# Patient Record
(demographics unavailable — no encounter records)

---

## 2024-10-11 NOTE — HISTORY OF PRESENT ILLNESS
[Flip Flops] : flip flops [FreeTextEntry1] : ANNE MARIE is a 75-year-old F present in the [place] office  had surgery of skin graft on 09/09 at [facility] for right foot infection. Patient was discharged from the hospital on 09/11/2024 with oral antibiotics she just finished abx. She saw ID on 10/3/24 states shes awaiting new abx from the ID team   Patient complains of tingling sensation on left foot and discomfort on right. Patient presents with right leg swelling, denies elevating.  FBS- 110 to 120   Wounds continue to improve  They have been applying xeroform DSD to wounds   Denies nausea/vomiting/fever/chills. VNS has been coming every other day

## 2024-10-11 NOTE — ASSESSMENT
[FreeTextEntry1] : RLE extensive ulcerations s/p wound debridement and graft application DOS 9/9/24. Not on any abx finished doxy saw ID awaiting new abx as per patient. Advised to contact them since they are helping with management Doing well vastly improved since prior encounter aseptic debridement performed of all ulcerations with #15 blade to subcutaneous tissues Applied xeroform 4x4 gauze kerlix and ace bandage may continue application q48 hours.   educated on signs and symptoms of infection. Advised Q48 hours dressing changes with xeroform 4x4 gauze kerlix and light ace PTR 1 week for wound care

## 2024-10-11 NOTE — PHYSICAL EXAM
[General Appearance - Alert] : alert [General Appearance - In No Acute Distress] : in no acute distress [0] : left foot posterior tibialis 0 [1+] : left foot dorsalis pedis 1+ [Foot Ulcer] : foot ulcer [Skin Induration] : skin induration [Vibration Dec.] : diminished vibratory sensation at the level of the toes [Delayed in the Left Toes] : capillary refills normal in the left toes [FreeTextEntry1] : Wounds vastly improved since graft application and improvement in perfusion. There is interval improvement in wounds Ulcerations measurement 9.4 x 5.0x  0.1  4.5 x 1.8 x 0.1 and 3.4 x 2.5 x 0.1. Previous tendon exposure has now granulated over. There is erythema from surgery but the redness and swelling has imporved since last encounter. There is no signs of clinical infection.  I aseptically debrided the wounds today with a #15 blade to subcutaneous tissue. No fluctuance/crepitus

## 2024-10-18 NOTE — PHYSICAL EXAM
[General Appearance - Alert] : alert [General Appearance - In No Acute Distress] : in no acute distress [0] : left foot posterior tibialis 0 [1+] : left foot dorsalis pedis 1+ [Foot Ulcer] : foot ulcer [Skin Induration] : skin induration [Vibration Dec.] : diminished vibratory sensation at the level of the toes [Delayed in the Left Toes] : capillary refills normal in the left toes [FreeTextEntry1] : Wounds vastly improved since graft application and improvement in perfusion. There is interval improvement in wounds Ulcerations measurement 8.4 x 4.8 x  0.1  4.5 x 1.8 x 0.1 and 3.4 x 2.5 x 0.1. Previous tendon exposure has now granulated over. There is erythema from surgery but the redness and swelling has improved since last encounter. There is no signs of clinical infection.  I aseptically debrided the wounds today with a #15 blade to subcutaneous tissue. No fluctuance/crepitus

## 2024-10-18 NOTE — ASSESSMENT
[FreeTextEntry1] : RLE extensive ulcerations s/p wound debridement and graft application DOS 9/9/24. Not on any abx - no clinical signs of worsening infection.  Doing well continues to improve We discussed possible Hyperbaric oxygen therapy referral patient and daughter declined she has tried in past and does not want to do it again.  aseptic debridement performed of all ulcerations with #15 blade to subcutaneous tissues Applied xeroform 4x4 gauze kerlix and ace bandage may continue application q48 hours.   educated on signs and symptoms of infection. Advised Q48 hours dressing changes with xeroform 4x4 gauze kerlix and light ace PTR 1 week for wound care

## 2024-10-18 NOTE — HISTORY OF PRESENT ILLNESS
[Flip Flops] : flip flops [FreeTextEntry1] : ANNE MARIE is a 75-year-old F present in the [place] office  had surgery of skin graft on 09/09 at [facility] for right foot infection. Patient was discharged from the hospital on 09/11/2024. She saw ID on 10/3/24. Shes awaiting new abx from the ID team. Patient's daughter states she has been calling ID doctor for the antibiotics and has not hear back from them  I told her she likely does not need anymore antibiotics and she probably doesnt have to follow up with them anyway since there is no signs of infection. Patient has VNS to change dressing twice a week   Patient complains of tingling sensation on left foot and discomfort on right. Patient presents with right leg swelling, denies elevating.  FBS- not taken today  Wounds continue to improve  They have been applying xeroform DSD to wounds   Denies nausea/vomiting/fever/chills. VNS has been coming every other day

## 2024-10-25 NOTE — PHYSICAL EXAM
[General Appearance - Alert] : alert [General Appearance - In No Acute Distress] : in no acute distress [0] : left foot posterior tibialis 0 [1+] : left foot dorsalis pedis 1+ [Foot Ulcer] : foot ulcer [Skin Induration] : skin induration [Vibration Dec.] : diminished vibratory sensation at the level of the toes [Delayed in the Left Toes] : capillary refills normal in the left toes [FreeTextEntry1] : Wounds vastly improved since graft application and improvement in perfusion. There is interval improvement in wounds Ulcerations measurement 7.0 x 4.0 x  0.1  1.5 x 2.0 x 0.1. . Previous tendon exposure has now granulated over. limited to subcutaneous tissue. There is no signs of clinical infection.  There is no erythema and or edema  I aseptically debrided the wounds today with a #15 blade to subcutaneous tissue. No fluctuance/crepitus

## 2024-10-25 NOTE — ASSESSMENT
[FreeTextEntry1] : RLE extensive ulcerations s/p wound debridement and graft application DOS 9/9/24. Not on any abx - no clinical signs of worsening infection.  Doing well continues to improve We discussed possible Hyperbaric oxygen therapy referral patient and daughter declined she has tried in past and does not want to do it again.  aseptic debridement performed of all ulcerations with #15 blade to subcutaneous tissues wounds cleansed prior to debridement Switched to collagen dressings Applied xeroform 4x4 gauze kerlix and ace bandage may continue application q48 hours.   educated on signs and symptoms of infection. Advised Q48 hours dressing changes with collagen xeroform 4x4 gauze kerlix and light ace PTR 2 week for wound care

## 2024-10-25 NOTE — HISTORY OF PRESENT ILLNESS
[Flip Flops] : flip flops [FreeTextEntry1] : ANNE MARIE is a 75-year-old F present in the [place] office  had surgery of skin graft on 09/09 at [facility] for right foot infection. Patient was discharged from the hospital on 09/11/2024.  Patient was seen by ID doctor 10/24/24. No more antibiotics needed as per ID has been off abx for 2 weeks. Doing well ambulating   Patient has VNS to change dressing twice a week   Noticed less drainage and states Swelling has decreased   FBS- not taken today usually in 120s   Wounds continue to improve  They have been applying xeroform DSD to wounds   Denies nausea/vomiting/fever/chills. VNS has been coming every other day

## 2024-10-30 NOTE — PHYSICAL EXAM
[General Appearance - Alert] : alert [General Appearance - In No Acute Distress] : in no acute distress [Hearing Threshold Finger Rub Not [place]] : hearing was normal [Neck Appearance] : the appearance of the neck was normal [] : no respiratory distress [Edema] : there was no peripheral edema [FreeTextEntry1] : healing right ankle ulcers, shalllow, no deep tissue exposure. Not acutely infected  [Oriented To Time, Place, And Person] : oriented to person, place, and time [Affect] : the affect was normal

## 2024-10-30 NOTE — PHYSICAL EXAM
[General Appearance - Alert] : alert [General Appearance - In No Acute Distress] : in no acute distress [Hearing Threshold Finger Rub Not [place]] : hearing was normal [Neck Appearance] : the appearance of the neck was normal [] : no respiratory distress [Edema] : there was no peripheral edema [FreeTextEntry1] : healing right ankle ulcers, shalllow, no deep tissue exposure. Not acutely infected  [Oriented To Time, Place, And Person] : oriented to person, place, and time [Affect] : the affect was normal

## 2024-10-30 NOTE — HISTORY OF PRESENT ILLNESS
[FreeTextEntry1] : 75F with uncontrolled DM (A1C 10.1), PAD s/p RLE bypass in [place], chronic right ankle ulcers, HTN, anxiety was hospitalized at [facility] 8/25 - 9/11 for worsening right ankle wounds with purulent discharge. MR right foot did not show evidence of OM or joint involvement. CTA aorta with run off with PSA of right femoral artery, occluded right femart with reconstitution of flow in popliteal artery and right foot vessels. Underwent diagnostic RLE angio on 9/3, right fem-pop bypass on 9/5 and wound debridement and graft application on 9/9. Treated with cefepime + daptomycin + metronidazole while hospitalized given PNC allergy.  surgical cultures with GNR. Discharged on levofloxacin 750 mg PO daily + doxycycline 100 mg PO BID for additional 10 days.   10/3 HFU - doing really well. Ambulating. Pain much controlled. Right ankle wounds slowly healing. Doing OK with antibiotics except for mild GI upset. Patient and daughter are very pleased with her progress.   10/24/2024 FU - wounds healing very well. Has been off abx for at least 1-2 weeks. Ambulating. Minimal pain. BS better controlled - hovering ~120.

## 2024-10-30 NOTE — HISTORY OF PRESENT ILLNESS
[FreeTextEntry1] : 75F with uncontrolled DM (A1C 10.1), PAD s/p RLE bypass in [place], chronic right ankle ulcers, HTN, anxiety was hospitalized at [facility] 8/25 - 9/11 for worsening right ankle wounds with purulent discharge. MR right foot did not show evidence of OM or joint involvement. CTA aorta with run off with PSA of right femoral artery, occluded right femart with reconstitution of flow in popliteal artery and right foot vessels. Underwent diagnostic RLE angio on 9/3, right fem-pop bypass on 9/5 and wound debridement and graft application on 9/9. Treated with cefepime + daptomycin + metronidazole while hospitalized given PNC allergy.  surgical cultures with GNR. Discharged on levofloxacin 750 mg PO daily + doxycycline 100 mg PO BID for additional 10 days.   10/3 HFU - doing really well. Ambulating. Pain much controlled. Right ankle wounds slowly healing. Doing OK with antibiotics except for mild GI upset. Patient and daughter are very pleased with her progress.   10/24/2024 FU - wounds healing very well. Has been off abx for at least 1-2 weeks. Ambulating. Minimal pain. BS better controlled - hovering ~120.

## 2024-10-30 NOTE — ASSESSMENT
[FreeTextEntry1] : 75F with uncontrolled DM (A1C 10.1), PAD s/p RLE bypass in [place], chronic right ankle ulcers, HTN, anxiety was hospitalized at [facility] 8/25 - 9/11 for worsening right ankle wounds with purulent discharge. MR right foot did not show evidence of OM or joint involvement. CTA aorta with run off with PSA of right femoral artery, occluded right fem art with reconstitution of flow in popliteal artery and right foot vessels. Underwent diagnostic RLE angio on 9/3, right fem-pop bypass on 9/5 and wound debridement and graft application on 9/9. Treated with cefepime + daptomycin + metronidazole while hospitalized given PNC allergy. surgical cultures with GNR. Discharged on levofloxacin 750 mg PO daily + doxycycline 100 mg PO BID.  Wounds are doing remarkably well without evidence of infection. She is ambulating without significant pain. Continue local wound care and follow up with podiatry and vascular surgery. No indication to continue antibiotics at this point.   All questions and concerns addressed with patient and daughter. Follow up in 2-4 weeks.

## 2024-10-30 NOTE — ASSESSMENT
[FreeTextEntry1] : 75F with uncontrolled DM (A1C 10.1), PAD s/p RLE bypass in [place], chronic right ankle ulcers, HTN, anxiety was hospitalized at [facility] 8/25 - 9/11 for worsening right ankle wounds with purulent discharge. MR right foot did not show evidence of OM or joint involvement. CTA aorta with run off with PSA of right femoral artery, occluded right fem art with reconstitution of flow in popliteal artery and right foot vessels. Underwent diagnostic RLE angio on 9/3, right fem-pop bypass on 9/5 and wound debridement and graft application on 9/9. Treated with cefepime + daptomycin + metronidazole while hospitalized given PNC allergy. surgical cultures with GNR. Discharged on levofloxacin 750 mg PO daily + doxycycline 100 mg PO BID.  Wounds are doing remarkably well without evidence of infection. She is ambulating without significant pain. Continue local wound care and follow up with podiatry and vascular surgery. No indication to continue antibiotics at this point.   All questions and concerns addressed with patient and daughter. Follow up in 2-4 weeks.

## 2024-11-01 NOTE — ASSESSMENT
[FreeTextEntry1] : RLE extensive ulcerations s/p wound debridement and graft application DOS 9/9/24. Not on any abx - no clinical signs of worsening infection.  Doing well continues to improve We discussed possible Hyperbaric oxygen therapy/ Wound care center referral patient and daughter declined she has tried in past and does not want to do it again.  aseptic debridement performed of all ulcerations with #15 blade to subcutaneous tissues wounds cleansed prior to debridement Continue collagen dressings via VNS with xeroform Applied xeroform 4x4 gauze kerlix and ace bandage may continue application q48 hours.  We discussed if she doesn't want to wound care center grafts may benefit from split thickness skin graft outpatient. We will rediscuss and possible pursue this next visit  educated on signs and symptoms of infection. Advised Q48 hours dressing changes with collagen dressing xeroform 4x4 gauze kerlix and light ace She has upcoming vascular appointment for follow up of her bypass site  PTR 2 week for wound care

## 2024-11-01 NOTE — PHYSICAL EXAM
[General Appearance - Alert] : alert [General Appearance - In No Acute Distress] : in no acute distress [Delayed in the Left Toes] : capillary refills normal in the left toes [0] : left foot posterior tibialis 0 [1+] : left foot dorsalis pedis 1+ [Foot Ulcer] : foot ulcer [Skin Induration] : skin induration [FreeTextEntry1] : Wounds continue to improve Ulcerations measurement 6.0 x 3.0 x  0.1  lateral foot .          anterior foot 1.5 x 0.5 x 0.1. . Previous tendon exposure has now granulated over. limited to subcutaneous tissue. There is no signs of clinical infection.  There is no erythema and or edema  I aseptically debrided the wounds today with a #15 blade to subcutaneous tissue. No fluctuance/crepitus [Vibration Dec.] : normal vibratory sensation at the level of the toes

## 2024-11-01 NOTE — HISTORY OF PRESENT ILLNESS
[Flip Flops] : flip flops [FreeTextEntry1] : ANNE MARIE is a 75-year-old F present in the [place] office  had surgery of skin graft on 09/09 at [facility] for right foot infection. Patient was discharged from the hospital on 09/11/2024.  off antibiotics Doing well ambulating. wounds continue to improve  Patient has VNS to change dressing twice a week   Noticed less drainage and states Swelling has decreased    A1C ?   Wounds continue to improve  They have been applying xeroform DSD to wounds   Denies nausea/vomiting/fever/chills. VNS has been coming every other day

## 2024-11-08 NOTE — HISTORY OF PRESENT ILLNESS
[FreeTextEntry1] : 75F with uncontrolled DM (A1C 10.1), PAD s/p RLE bypass in [place], chronic right ankle ulcers, HTN, anxiety was hospitalized at [facility] 8/25 - 9/11 for worsening right ankle wounds with purulent discharge. MR right foot did not show evidence of OM or joint involvement. CTA aorta with run off with PSA of right femoral artery, occluded right femart with reconstitution of flow in popliteal artery and right foot vessels. Underwent diagnostic RLE angio on 9/3, right fem-pop bypass on 9/5 and wound debridement and graft application on 9/9. Treated with cefepime + daptomycin + metronidazole while hospitalized given PNC allergy.  surgical cultures with GNR. Discharged on levofloxacin 750 mg PO daily + doxycycline 100 mg PO BID for additional 10 days.   10/3 HFU - doing really well. Ambulating. Pain much controlled. Right ankle wounds slowly healing. Doing OK with antibiotics except for mild GI upset. Patient and daughter are very pleased with her progress.   10/24/2024 FU - wounds healing very well. Has been off abx for at least 1-2 weeks. Ambulating. Minimal pain. BS better controlled - hovering ~120.   11/7/2024 FU - wounds continue to heal. No drainage, increasing pain or redness. Off abx for ~ 1month. Offered another skin graft. Has pain in foot at night. Ambulating with cane.

## 2024-11-08 NOTE — HISTORY OF PRESENT ILLNESS
[Flip Flops] : flip flops [FreeTextEntry1] : ANNE MARIE is a 75-year-old F present in the [place] office  had surgery of skin graft on 09/09 at [facility] for right foot infection. Patient was discharged from the hospital on 09/11/2024.  off antibiotics Doing well ambulating. wounds continue to improve Seen by ID doctor 11/7/24. No more antibiotic needed and was discharged  Patient has VNS to change dressing twice a week   Noticed less drainage and states Swelling has decreased     A1C ?  Has upcoming PCP appointment this week  admits continued nerve pain lateral ankle, states previous gabapentin helped but no longer has any  Denies nausea/vomiting/fever/chills. VNS has been coming every other day

## 2024-11-08 NOTE — PHYSICAL EXAM
[General Appearance - Alert] : alert [General Appearance - In No Acute Distress] : in no acute distress [0] : left foot posterior tibialis 0 [1+] : left foot dorsalis pedis 1+ [Foot Ulcer] : foot ulcer [Skin Induration] : skin induration [Delayed in the Left Toes] : capillary refills normal in the left toes [FreeTextEntry1] : Wounds continue to improve Ulcerations measurement 5.5 x 2.5 x  0.1  lateral foot . continues to improve          anterior foot ulcer healed since prior encounter. Previous tendon exposure has now granulated over. limited to subcutaneous tissue. There is no signs of clinical infection.  There is no erythema and or edema  I aseptically debrided the wounds today with a #15 blade to subcutaneous tissue. No fluctuance/crepitus [Vibration Dec.] : normal vibratory sensation at the level of the toes

## 2024-11-08 NOTE — HISTORY OF PRESENT ILLNESS
[FreeTextEntry1] : 75F with uncontrolled DM (A1C 10.1), PAD s/p RLE bypass in [place], chronic right ankle ulcers, HTN, anxiety was hospitalized at [facility] 8/25 - 9/11 for worsening right ankle wounds with purulent discharge. MR right foot did not show evidence of OM or joint involvement. CTA aorta with run off with PSA of right femoral artery, occluded right femart with reconstitution of flow in popliteal artery and right foot vessels. Underwent diagnostic RLE angio on 9/3, right fem-pop bypass on 9/5 and wound debridement and graft application on 9/9. Treated with cefepime + daptomycin + metronidazole while hospitalized given PNC allergy.  surgical cultures with GNR. Discharged on levofloxacin 750 mg PO daily + doxycycline 100 mg PO BID for additional 10 days.   10/3 HFU - doing really well. Ambulating. Pain much controlled. Right ankle wounds slowly healing. Doing OK with antibiotics except for mild GI upset. Patient and daughter are very pleased with her progress.   10/24/2024 FU - wounds healing very well. Has been off abx for at least 1-2 weeks. Ambulating. Minimal pain. BS better controlled - hovering ~120.   11/7/2024 FU - wounds continue to heal. No drainage, increasing pain or redness. Off abx for ~ 1month. Offered another skin graft. Has pain in foot at night. Ambulating with cane.

## 2024-11-08 NOTE — ASSESSMENT
[FreeTextEntry1] : RLE extensive ulcerations s/p wound debridement and graft application DOS 9/9/24. Not on any abx - no clinical signs of worsening infection.  Doing well continues to improve I prescribed gabapentin 300mg nightly for nerve pain. Discussed risks/benefits/side effects with patient and daughter. discontinue if drowsy  We discussed possible Hyperbaric oxygen therapy/ Wound care center referral patient and daughter declined she has tried in past and does not want to do it again.  aseptic debridement performed of all ulcerations with #15 blade to subcutaneous tissues wounds cleansed prior to debridement Continue collagen dressings via VNS with xeroform Applied xeroform 4x4 gauze kerlix and ace bandage may continue application q48 hours.  We discussed if she doesn't want to wound care center grafts may benefit from split thickness skin graft outpatient. We will rediscuss and possible pursue this next visit. Will wait for december if still present  educated on signs and symptoms of infection. Advised Q48 hours dressing changes with collagen dressing xeroform 4x4 gauze kerlix and light ace PTR 2 week for wound care

## 2024-11-08 NOTE — ASSESSMENT
[FreeTextEntry1] : 75F with uncontrolled DM (A1C 10.1), PAD s/p RLE bypass in [place], chronic right ankle ulcers, HTN, anxiety was hospitalized at [facility] 8/25 - 9/11 for worsening right ankle wounds with purulent discharge. MR right foot did not show evidence of OM or joint involvement. CTA aorta with run off with PSA of right femoral artery, occluded right fem art with reconstitution of flow in popliteal artery and right foot vessels. Underwent diagnostic RLE angio on 9/3, right fem-pop bypass on 9/5 and wound debridement and graft application on 9/9. Treated with cefepime + daptomycin + metronidazole while hospitalized given PNC allergy. surgical cultures with GNR. Discharged on levofloxacin 750 mg PO daily + doxycycline 100 mg PO BID.  Wounds are doing remarkably well without evidence of infection. She is ambulating without significant pain. Continue local wound care and follow up with podiatry and vascular surgery. No indication to continue antibiotics at this point.  All questions and concerns addressed with patient and family in Pashto. Follow up as needed.

## 2024-11-08 NOTE — ASSESSMENT
[FreeTextEntry1] : 75F with uncontrolled DM (A1C 10.1), PAD s/p RLE bypass in [place], chronic right ankle ulcers, HTN, anxiety was hospitalized at [facility] 8/25 - 9/11 for worsening right ankle wounds with purulent discharge. MR right foot did not show evidence of OM or joint involvement. CTA aorta with run off with PSA of right femoral artery, occluded right fem art with reconstitution of flow in popliteal artery and right foot vessels. Underwent diagnostic RLE angio on 9/3, right fem-pop bypass on 9/5 and wound debridement and graft application on 9/9. Treated with cefepime + daptomycin + metronidazole while hospitalized given PNC allergy. surgical cultures with GNR. Discharged on levofloxacin 750 mg PO daily + doxycycline 100 mg PO BID.  Wounds are doing remarkably well without evidence of infection. She is ambulating without significant pain. Continue local wound care and follow up with podiatry and vascular surgery. No indication to continue antibiotics at this point.  All questions and concerns addressed with patient and family in Greek. Follow up as needed.

## 2024-11-08 NOTE — PHYSICAL EXAM
[General Appearance - Alert] : alert [General Appearance - In No Acute Distress] : in no acute distress [Sclera] : the sclera and conjunctiva were normal [Hearing Threshold Finger Rub Not [place]] : hearing was normal [Neck Appearance] : the appearance of the neck was normal [] : no respiratory distress [Edema] : there was no peripheral edema [FreeTextEntry1] : as above  [Oriented To Time, Place, And Person] : oriented to person, place, and time [Affect] : the affect was normal

## 2024-11-08 NOTE — PHYSICAL EXAM
[General Appearance - Alert] : alert [General Appearance - In No Acute Distress] : in no acute distress [Sclera] : the sclera and conjunctiva were normal [Hearing Threshold Finger Rub Not [place]] : hearing was normal [Neck Appearance] : the appearance of the neck was normal [] : no respiratory distress [Edema] : there was no peripheral edema [FreeTextEntry1] : as above  [Oriented To Time, Place, And Person] : oriented to person, place, and time [Affect] : the affect was normal

## 2024-11-22 NOTE — ASSESSMENT
[FreeTextEntry1] : RLE extensive ulcerations s/p wound debridement and graft application DOS 9/9/24. On levoquin for pneumonia, no clinical signs of infection  Doing well continues to improve Continue prescribed gabapentin 300mg nightly for nerve pain. Discussed risks/benefits/side effects with patient and daughter. discontinue if drowsy -  she has no side effects thus far We discussed possible Hyperbaric oxygen therapy/ Wound care center referral patient and daughter declined she has tried in past and does not want to do it again. - she is doing well likely doest need at this time aseptic debridement performed of all ulcerations with #15 blade to subcutaneous tissues wounds cleansed prior to debridement Continue collagen dressings via VNS with xeroform Applied xeroform 4x4 gauze kerlix and ace bandage may continue application q48 hours.  We discussed if she doesn't want to wound care center grafts may benefit from split thickness skin graft outpatient. will defer for now as wound continues to dramatically improve   educated on signs and symptoms of infection. Advised Q48 hours dressing changes with collagen dressing xeroform 4x4 gauze kerlix and light ace PTR 2 week for wound care

## 2024-11-22 NOTE — HISTORY OF PRESENT ILLNESS
[Flip Flops] : flip flops [FreeTextEntry1] : ANNE MARIE is a 75-year-old F present in the [place] office  had surgery of skin graft on 09/09 at [facility] for right foot infection. Patient was discharged from the hospital on 09/11/2024.   wounds continue to improve, Noticed less drainage but color has changed to yellowish.  Patient has VNS to change dressing twice a week and has been applying compression with ace bandages and collage dressings    A1C 7.1 % Nov 2024 admits continued nerve pain lateral ankle, states gabapentin continues to help Evaluated by vascular 11/19/24. She states she has developed  like a balloon in the top back of her calf, had sonogram done and was told she has a fluid which is normal after surgery and nothing to worry about it.   Denies nausea/vomiting/fever/chills.

## 2024-11-22 NOTE — PHYSICAL EXAM
[General Appearance - Alert] : alert [General Appearance - In No Acute Distress] : in no acute distress [0] : left foot posterior tibialis 0 [1+] : left foot dorsalis pedis 1+ [Foot Ulcer] : foot ulcer [Skin Induration] : skin induration [Delayed in the Left Toes] : capillary refills normal in the left toes [FreeTextEntry1] : Wounds continue to improve Ulcerations measurement 4.8 x 2.3 x  0.1  lateral foot . continues to improve          anterior foot ulcer healed since prior encounter. Previous tendon exposure has now granulated over. limited to subcutaneous tissue. There is no signs of clinical infection.  There is no erythema and or edema  I aseptically debrided the wounds today with a #15 blade to subcutaneous tissue. No fluctuance/crepitus [Vibration Dec.] : normal vibratory sensation at the level of the toes

## 2024-11-24 NOTE — ASSESSMENT
[FreeTextEntry1] : patient ~ 3 months s/p RLE fem-pop bypass . doing well post op bypass widely patent + seroma along distal anastomosis . will continue with 3 month f/u

## 2024-11-24 NOTE — PHYSICAL EXAM
[Respiratory Effort] : normal respiratory effort [de-identified] : appears well  [FreeTextEntry1] : RLE triphasic dp/pt signals  [de-identified] : right legs wound essentially healed along lateral foot , + palpable seroma at distal thigh site

## 2024-11-24 NOTE — HISTORY OF PRESENT ILLNESS
[de-identified] : pt s/p right femoral artery to below knee pop bypass at end of august . doing well . here for 3 months follow up , right lateral foot wound almost healed

## 2024-12-06 NOTE — HISTORY OF PRESENT ILLNESS
[Flip Flops] : flip flops [FreeTextEntry1] : ANNE MARIE is a 75-year-old F presents for follow up for RLE wounds  Had graft application 9/9 ulcers have been improving admits some anterior ankle pain associated with swelling likely post bypass edema  Denies nausea/vomiting/fever/chills.   saw vascular 11/24 - [facility] bypass site doing well

## 2024-12-06 NOTE — ASSESSMENT
[FreeTextEntry1] : RLE extensive ulcerations s/p wound debridement and graft application DOS 9/9/24. Doing well continues to improve Continue prescribed gabapentin 300mg nightly for nerve pain. Discussed risks/benefits/side effects with patient and daughter. discontinue if drowsy -  she has no side effects thus far We discussed possible Hyperbaric oxygen therapy/ Wound care center referral patient and daughter declined she has tried in past and does not want to do it again. - she is improving and they want to defer aseptic debridement performed of all ulcerations with #15 blade to subcutaneous tissues wounds cleansed prior to debridement Continue collagen dressings via VNS with xeroform Applied xeroform 4x4 gauze kerlix and ace bandage may continue application q48 hours.  We discussed if she doesn't want to wound care center grafts may benefit from split thickness skin graft outpatient. will defer for now if isnt morei improved after holidays they may consider   educated on signs and symptoms of infection. Advised Q48 hours dressing changes with collagen dressing xeroform 4x4 gauze kerlix and light ace PTR 2 week for wound care

## 2024-12-06 NOTE — PHYSICAL EXAM
[General Appearance - Alert] : alert [General Appearance - In No Acute Distress] : in no acute distress [0] : left foot posterior tibialis 0 [1+] : left foot dorsalis pedis 1+ [Foot Ulcer] : foot ulcer [Skin Induration] : skin induration [Delayed in the Left Toes] : capillary refills normal in the left toes [FreeTextEntry3] : mild interval increase of edema [FreeTextEntry1] : Wounds continue to improve Ulcerations measurement 4.8 x 2.1 x  0.1  lateral foot . continues to improve          anterior foot ulcer healed since prior encounter. Previous tendon exposure has now granulated over. limited to subcutaneous tissue. There is no signs of clinical infection.  There is no erythema and or edema  I aseptically debrided the wounds today with a #15 blade to subcutaneous tissue. No fluctuance/crepitus [Vibration Dec.] : normal vibratory sensation at the level of the toes

## 2024-12-20 NOTE — PHYSICAL EXAM
[General Appearance - Alert] : alert [General Appearance - In No Acute Distress] : in no acute distress [0] : left foot posterior tibialis 0 [1+] : left foot dorsalis pedis 1+ [Foot Ulcer] : foot ulcer [Skin Induration] : skin induration [Oriented To Time, Place, And Person] : oriented to person, place, and time [Impaired Insight] : insight and judgment were intact [Delayed in the Left Toes] : capillary refills normal in the left toes [FreeTextEntry3] : mild interval increase of edema [FreeTextEntry1] : Wounds continue to improve Ulcerations measurement 4.8 x 1.7  x  0.1  lateral foot . continues to improve, has been stagnant recently         anterior foot ulcer healed since prior encounter. Previous tendon exposure has now granulated over. limited to subcutaneous tissue. There is no signs of clinical infection.  There is no erythema and or edema. There is skin atrophy and chronic skin changes from peripheral arterial disease. There is diffuse allodynia foot there is no calf tenderness. There is no fluctuance/crepitus  I aseptically debrided the wounds today with a #15 blade to subcutaneous tissue. No fluctuance/crepitus [Vibration Dec.] : normal vibratory sensation at the level of the toes

## 2024-12-20 NOTE — ASSESSMENT
[FreeTextEntry1] : RLE extensive ulcerations s/p wound debridement and graft application DOS 9/9/24. Doing well continues to improve, recently stagnant Will discuss case with PCP i called office and left message and follow up culture although little concern for infection She has allodynia type pain pertaining to her neuropathy/PAD Continue prescribed gabapentin 300mg nightly for nerve pain. Her pcp increased dosage to additional 100 in morning which i agree with Discussed risks/benefits/side effects with patient and daughter. discontinue if drowsy -  she has no side effects thus far We discussed possible Hyperbaric oxygen therapy/ Wound care center referral patient and daughter declined she has tried in past and does not want to do it again. -  they want to defer aseptic debridement performed of all ulcerations with #15 blade to subcutaneous tissues wounds cleansed prior to debridement Continue collagen dressings via VNS with xeroform Applied xeroform 4x4 gauze kerlix and ace bandage may continue application q48 hours.  Will plan for additional graft application in coming weeks based on availability  educated on signs and symptoms of infection. Advised Q48 hours dressing changes with collagen dressing xeroform 4x4 gauze kerlix and light ace PTR 1-2 week for wound care  I then counseled the patient on performing self-examination of the feet on a daily basis. I explained the importance of this due to the diminished sensation and the greater susceptibility of getting an infection and having additional complications due to the medical condition that exists, especially if they lack protective sensation on their feet. I advised the patient to notify the office right away if increased redness, swelling, pain, open wounds or discharge were observed. I explained the importance of checking the glucose regularly and of keeping the glucose level between 90 -110 and more importantly controlling the HbA1C keeping consistent and trying to achieve as close to normal and HbA1C as possible around 6.0. I told the patient to notify the MD if the glucose level changed to above or below those levels. Advised to check feet daily and do not walk barefoot

## 2024-12-20 NOTE — HISTORY OF PRESENT ILLNESS
[Flip Flops] : flip flops [FreeTextEntry1] : ANNE MARIE is a 75-year-old F presents for follow up for RLE wounds  Had graft application 9/9 ulcers have been improving admits some anterior ankle pain associated with swelling likely post bypass edema  Denies nausea/vomiting/fever/chills.   saw vascular 11/24 - [facility] bypass site doing well   Update 12/20  Patient states she had an appointment on December 13th with her pcp Dr. Lopez for bloodwork results and a follow up. Patient states she complaint to her pcp about having pain on the wound. Patient daughter states Dr. Lopez took a culture of the wound. Patient daughter states she received a call today from her pcp stating she has a bacteria in her wound. Patient daughter states she sent medication to the pharmacy she is not aware what type of medication was sent to the pharmacy. Patient daughter would like Dr. Box to call her pcp to speak about her condition. Patient has an appointment with her pcp on January 31 for a month follow up.  Dr. Lopez [phone] [facility]  A1c- 7.

## 2025-01-02 NOTE — ASSESSMENT
[FreeTextEntry1] : RLE extensive ulcerations s/p wound debridement and graft application DOS 9/9/24. Doing well continues to improve, She has allodynia type pain pertaining to her neuropathy/PAD Continue prescribed gabapentin 300mg nightly for nerve pain. Her pcp increased dosage to additional 100 in morning which i agree with Discussed risks/benefits/side effects with patient and daughter. discontinue if drowsy -  she has no side effects thus far We discussed possible Hyperbaric oxygen therapy/ Wound care center referral patient and daughter declined she has tried in past and does not want to do it again. -  they want to defer aseptic debridement performed of all ulcerations with #15 blade to subcutaneous tissues wounds cleansed prior to debridement Continue collagen dressings via VNS with xeroform Applied xeroform 4x4 gauze kerlix and ace bandage may continue application q48 hours.  Will plan for additional graft application in coming weeks based on availability - she is to get clearance  educated on signs and symptoms of infection. Advised Q48 hours dressing changes with collagen dressing xeroform 4x4 gauze kerlix and light ace PTR 1-2 week for wound care  I then counseled the patient on performing self-examination of the feet on a daily basis. I explained the importance of this due to the diminished sensation and the greater susceptibility of getting an infection and having additional complications due to the medical condition that exists, especially if they lack protective sensation on their feet. I advised the patient to notify the office right away if increased redness, swelling, pain, open wounds or discharge were observed. I explained the importance of checking the glucose regularly and of keeping the glucose level between 90 -110 and more importantly controlling the HbA1C keeping consistent and trying to achieve as close to normal and HbA1C as possible around 6.0. I told the patient to notify the MD if the glucose level changed to above or below those levels. Advised to check feet daily and do not walk barefoot

## 2025-01-02 NOTE — PHYSICAL EXAM
[General Appearance - Alert] : alert [General Appearance - In No Acute Distress] : in no acute distress [0] : left foot posterior tibialis 0 [1+] : left foot dorsalis pedis 1+ [Foot Ulcer] : foot ulcer [Skin Induration] : skin induration [Oriented To Time, Place, And Person] : oriented to person, place, and time [Impaired Insight] : insight and judgment were intact [Delayed in the Left Toes] : capillary refills normal in the left toes [FreeTextEntry3] : mild interval increase of edema [FreeTextEntry1] : Wounds continue to improve Ulcerations measurement 4.5x 1.5  x  0.1  lateral foot/ankle . continues to improve,         anterior foot ulcer healed since prior encounter. Previous tendon exposure has now granulated over. limited to subcutaneous tissue. There is no signs of clinical infection.  There is no erythema and or edema. There is skin atrophy and chronic skin changes from peripheral arterial disease. There is diffuse allodynia foot there is no calf tenderness. There is no fluctuance/crepitus  I aseptically debrided the wounds today with a #15 blade to subcutaneous tissue. No fluctuance/crepitus [Vibration Dec.] : normal vibratory sensation at the level of the toes

## 2025-01-02 NOTE — HISTORY OF PRESENT ILLNESS
[Flip Flops] : flip flops [FreeTextEntry1] : ANNE MARIE is a 75-year-old F presents for follow up for RLE wounds  Had graft application 9/9 ulcers have been improving admits some anterior ankle pain associated with swelling likely post bypass edema  Denies nausea/vomiting/fever/chills.   saw vascular 11/24 - [facility] bypass site doing well   Update 12/20  Patient states she had an appointment on December 13th with her pcp Dr. Lopez for bloodwork results and a follow up. Patient states she complaint to her pcp about having pain on the wound. Patient daughter states Dr. Lopez took a culture of the wound. Patient daughter states she received a call today from her pcp stating she has a bacteria in her wound. Patient daughter states she sent medication to the pharmacy she is not aware what type of medication was sent to the pharmacy. Patient daughter would like Dr. Box to call her pcp to speak about her condition. Patient has an appointment with her pcp on January 31 for a month follow up.  Dr. Lopez [phone] [facility]  A1c- 7.  Update 01/02  Patient is present for dsd dressings. Follow for right lateral ankle wound. Patient is complaining of minimal pain. States pain is getting better. On gabapentin low dose.  Patient daughter states she received a call from our office for the surgery she states they did not give her a date pending graft application right ankle.  Patient daughter states she is not able to get a medical clearance appointment with her pcp until January 10th.  [id number]

## 2025-01-16 NOTE — PHYSICAL EXAM
[General Appearance - Alert] : alert [General Appearance - In No Acute Distress] : in no acute distress [0] : left foot posterior tibialis 0 [1+] : left foot dorsalis pedis 1+ [Foot Ulcer] : foot ulcer [Skin Induration] : skin induration [Oriented To Time, Place, And Person] : oriented to person, place, and time [Impaired Insight] : insight and judgment were intact [Delayed in the Left Toes] : capillary refills normal in the left toes [FreeTextEntry3] : There is mild edema in right leg [FreeTextEntry1] : Interval increase in wound size Ulcerations measurement 6.0x 1.8  x  0.2  lateral foot/ankle  There is no signs of clinical infection.  There is no erythema and or edema. There is skin atrophy and chronic skin changes from peripheral arterial disease. There is diffuse allodynia foot there is no calf tenderness. There is no fluctuance/crepitus  I aseptically debrided the wounds today with a #15 blade to subcutaneous tissue. No fluctuance/crepitus [Vibration Dec.] : normal vibratory sensation at the level of the toes

## 2025-01-16 NOTE — HISTORY OF PRESENT ILLNESS
[Flip Flops] : flip flops [FreeTextEntry1] : ANNE MARIE is a 75-year-old F presents for follow up for RLE wounds  Had graft application 9/9 ulcers have been improving admits some anterior ankle pain associated with swelling likely post bypass edema  Denies nausea/vomiting/fever/chills.   saw vascular 11/24 - [facility] bypass site doing well   Update 12/20  Patient states she had an appointment on December 13th with her pcp Dr. Lopez for bloodwork results and a follow up. Patient states she complaint to her pcp about having pain on the wound. Patient daughter states Dr. Lopez took a culture of the wound. Patient daughter states she received a call today from her pcp stating she has a bacteria in her wound. Patient daughter states she sent medication to the pharmacy she is not aware what type of medication was sent to the pharmacy. Patient daughter would like Dr. Box to call her pcp to speak about her condition. Patient has an appointment with her pcp on January 31 for a month follow up.  Dr. Lopez [phone] [facility]  A1c- 7.  Update 01/02  Patient is present for dsd dressings. Follow for right lateral ankle wound. Patient is complaining of minimal pain. States pain is getting better. On gabapentin low dose.  Patient daughter states she received a call from our office for the surgery she states they did not give her a date pending graft application right ankle.  Patient daughter states she is not able to get a medical clearance appointment with her pcp until January 10th.  [id number]   Update 01/16 Patient is present for follow up on right lateral ankle wound. Patient is complaining of continued nerve pain. Gabapentin has been providing relief. Patient is present for dsd dressings. Patient saw her pcp on January 10th she states everything went well. Patient has a vsn who goes twice a week for dressings change. Patient had dressings change yesterday by the vsn. Denies any increased drainage redness or swelling. States has upcoming vascular appointment [id number]

## 2025-01-16 NOTE — ASSESSMENT
[FreeTextEntry1] : RLE extensive ulcerations s/p wound debridement and graft application DOS 9/9/24. Wounds stagnant, increased size from prior visit She has allodynia type pain pertaining to her neuropathy/PAD Continue prescribed gabapentin 300mg nightly for nerve pain. Her pcp increased dosage to additional 100 in morning which i agree with Discussed risks/benefits/side effects with patient and daughter. discontinue if drowsy -  she has no side effects thus far - represcribed for her We discussed possible Hyperbaric oxygen therapy/ Wound care center referral patient and daughter declined she has tried in past and does not want to do it again. -  they want to defer - I will send her info for topical O2 to help tissue perfusion - She has upcoming vascular appointment aseptic debridement performed of all ulcerations with #15 blade to subcutaneous tissues wounds cleansed prior to debridement Continue collagen dressings via VNS with xeroform Applied xeroform 4x4 gauze kerlix and ace bandage may continue application q48 hours.  Will plan for additional graft application in coming weeks based on availability - she is to get clearance will have coordinator reach out to her   educated on signs and symptoms of infection. Advised Q48 hours dressing changes with collagen dressing xeroform 4x4 gauze kerlix and light ace PTR 2 week for wound care  I then counseled the patient on performing self-examination of the feet on a daily basis. I explained the importance of this due to the diminished sensation and the greater susceptibility of getting an infection and having additional complications due to the medical condition that exists, especially if they lack protective sensation on their feet. I advised the patient to notify the office right away if increased redness, swelling, pain, open wounds or discharge were observed. I explained the importance of checking the glucose regularly and of keeping the glucose level between 90 -110 and more importantly controlling the HbA1C keeping consistent and trying to achieve as close to normal and HbA1C as possible around 6.0. I told the patient to notify the MD if the glucose level changed to above or below those levels. Advised to check feet daily and do not walk barefoot

## 2025-01-30 NOTE — HISTORY OF PRESENT ILLNESS
[Flip Flops] : flip flops [FreeTextEntry1] : ANNE MARIE is a 75-year-old F presents for follow up for RLE wounds  Had graft application 9/9 ulcers have been improving admits some anterior ankle pain associated with swelling likely post bypass edema  Denies nausea/vomiting/fever/chills.   saw vascular 11/24 - [facility] bypass site doing well   A1c- 7.  Update 01/02  Patient is present for dsd dressings. Follow for right lateral ankle wound. Patient is complaining of minimal pain. States pain is getting better. On gabapentin low dose.  Patient daughter states she received a call from our office for the surgery she states they did not give her a date pending graft application right ankle.  Patient daughter states she is not able to get a medical clearance appointment with her pcp until January 10th.  [id number]   Update 01/16 Patient is present for follow up on right lateral ankle wound. Patient is complaining of continued nerve pain. Gabapentin has been providing relief. Patient is present for dsd dressings. Patient saw her pcp on January 10th she states everything went well. Patient has a vsn who goes twice a week for dressings change. Patient had dressings change yesterday by the vsn. Denies any increased drainage redness or swelling. States has upcoming vascular appointment [id number]   Update 1/30 Patient presents with EO2 machine. Nurse changes dresses with collagen and dsd.  Patient has minimal drainage. States was confused about E02 use.  Admits continued neuropathy pain. did not start increasing gabapentin dosage. Denies any nausea/vomiting/fever chills FBS: 200

## 2025-01-30 NOTE — PHYSICAL EXAM
[General Appearance - Alert] : alert [General Appearance - In No Acute Distress] : in no acute distress [0] : left foot posterior tibialis 0 [1+] : left foot dorsalis pedis 1+ [Foot Ulcer] : foot ulcer [Skin Induration] : skin induration [Oriented To Time, Place, And Person] : oriented to person, place, and time [Impaired Insight] : insight and judgment were intact [Delayed in the Left Toes] : capillary refills normal in the left toes [FreeTextEntry3] : There is mild edema in right leg, bypass present with bounding pulses present  [FreeTextEntry1] : Interval improvement in wound compared to last week Ulcerations measurement 5.2 x 1.8  x  0.2  lateral foot/ankle  There is no signs of clinical infection.  There is no erythema and or edema. There is skin atrophy and chronic skin changes from peripheral arterial disease. There is diffuse allodynia foot there is no calf tenderness. There is no fluctuance/crepitus  70% fibrotic  I aseptically debrided the wounds today with a #15 blade to subcutaneous tissue. No fluctuance/crepitus [Vibration Dec.] : normal vibratory sensation at the level of the toes

## 2025-01-30 NOTE — ASSESSMENT
[FreeTextEntry1] : RLE extensive ulcerations s/p wound debridement and graft application DOS 9/9/24. Wound gradual improvement  She has allodynia type pain pertaining to her neuropathy/PAD Continue prescribed gabapentin 300mg nightly for nerve pain. Her pcp increased dosage to additional 100 in morning which i agree with Discussed risks/benefits/side effects with patient and daughter. discontinue if drowsy -  she has no side effects thus far - represcribed for her i educated her on dosage and side effects to look out for it seems to be helping with pain We discussed possible Hyperbaric oxygen therapy/ Wound care center referral patient and daughter declined she has tried in past and does not want to do it again. -  they want to defer She received E02 topical oxygen which we are utilizing. The machine was not charged today. i instructed them on how to utilize and charge the machine. I applied sanyl, hydrofera blue and topical oxygen pad and wrapped the site with kerlix and light coband only to foot not to compress bypass site - She has upcoming vascular appointment in February  aseptic debridement performed of all ulcerations with #15 blade to subcutaneous tissues wounds cleansed prior to debridement Continue VNS 3 times a week can continue dressings as noted today  Will plan for additional graft application in coming weeks based on availability - she is to get clearance (has upcoming appointment) will have coordinator reach out to her   educated on signs and symptoms of infection. Advised Q48 hours dressing changes with collagen dressing xeroform 4x4 gauze kerlix and light ace PTR 2 week for wound care  I then counseled the patient on performing self-examination of the feet on a daily basis. I explained the importance of this due to the diminished sensation and the greater susceptibility of getting an infection and having additional complications due to the medical condition that exists, especially if they lack protective sensation on their feet. I advised the patient to notify the office right away if increased redness, swelling, pain, open wounds or discharge were observed. I explained the importance of checking the glucose regularly and of keeping the glucose level between 90 -110 and more importantly controlling the HbA1C keeping consistent and trying to achieve as close to normal and HbA1C as possible around 6.0. I told the patient to notify the MD if the glucose level changed to above or below those levels. Advised to check feet daily and do not walk barefoot

## 2025-02-06 NOTE — HISTORY OF PRESENT ILLNESS
[Flip Flops] : flip flops [FreeTextEntry1] : ANNE MARIE is a 75-year-old F presents for follow up for RLE wounds  Had graft application 9/9 ulcers have been improving admits some anterior ankle pain associated with swelling likely post bypass edema  Denies nausea/vomiting/fever/chills.   saw vascular 11/24 - sikalas bypass site doing well   A1c- 7.  Presents with new wound on ankle, follow up for wound care she noticed it yesterday after VNS removed dressings. Eo2 device was removed due to swelling.  wound correlates to where she had device on her leg as per daughter may have been two tight. has vascular appointment 21st Pending a skin graft application in hospital on 2/25 Patient is going to pre op appts.  fbs: 160

## 2025-02-06 NOTE — PHYSICAL EXAM
[General Appearance - Alert] : alert [General Appearance - In No Acute Distress] : in no acute distress [0] : left foot posterior tibialis 0 [1+] : left foot dorsalis pedis 1+ [Foot Ulcer] : foot ulcer [Skin Induration] : skin induration [Oriented To Time, Place, And Person] : oriented to person, place, and time [Impaired Insight] : insight and judgment were intact [Delayed in the Left Toes] : capillary refills normal in the left toes [FreeTextEntry3] : There is mild edema in right leg, bypass present with bounding pulses present  [FreeTextEntry1] : Wound stagnant same size from previous week.  Ulcerations measurement 5.2 x 1.8  x  0.2  lateral foot/ankle  There is no signs of clinical infection.  There is no erythema and or edema. There is skin atrophy and chronic skin changes from peripheral arterial disease. There is diffuse allodynia foot there is no calf tenderness. There is no fluctuance/crepitus  Wound  70% fibrotic  I aseptically debrided the wounds today with a #15 blade to subcutaneous tissue. No fluctuance/crepitus  There is new anterior ankle small 3 ulcerations they are each 0.4 x 0.4 x 0.1 with mild serous drainage does not probe. no erythema  there is mild edema which has been consistent with previous weeks  [Vibration Dec.] : normal vibratory sensation at the level of the toes

## 2025-02-06 NOTE — ASSESSMENT
[FreeTextEntry1] : RLE extensive ulcerations s/p wound debridement and graft application DOS 9/9/24. New onset right anterior ankle ulceration since last week  She has allodynia type pain pertaining to her neuropathy/PAD Continue prescribed gabapentin 300mg nightly for nerve pain.  We discussed possible Hyperbaric oxygen therapy/ Wound care center referral patient and daughter declined she has tried in past and does not want to do it again. -  they want to defer Discontinue E02 device as its improper use may have attributed to an anterior ankle ulcerations but applying too much compression, switch back to collagen dressings with silver alginate  - She has upcoming vascular appointment  aseptic debridement performed of all ulcerations with #15 blade to subcutaneous tissues wounds cleansed prior to debridement Continue VNS 3 times a week can continue dressings as noted today  has graft application surgery booked in a few weeks  educated on signs and symptoms of infection. Advised Q48 hours dressing changes with collagen dressing xeroform 4x4 gauze kerlix and light ace PTR 2 week for wound care  I then counseled the patient on performing self-examination of the feet on a daily basis. I explained the importance of this due to the diminished sensation and the greater susceptibility of getting an infection and having additional complications due to the medical condition that exists, especially if they lack protective sensation on their feet. I advised the patient to notify the office right away if increased redness, swelling, pain, open wounds or discharge were observed. I explained the importance of checking the glucose regularly and of keeping the glucose level between 90 -110 and more importantly controlling the HbA1C keeping consistent and trying to achieve as close to normal and HbA1C as possible around 6.0. I told the patient to notify the MD if the glucose level changed to above or below those levels. Advised to check feet daily and do not walk barefoot

## 2025-02-20 NOTE — HISTORY OF PRESENT ILLNESS
[Flip Flops] : flip flops [FreeTextEntry1] : follow up right lower extremity ulcerations has vascular appointment 21st Pending a skin graft application in hospital on 2/25 Patient is going to pre op appts.  Will be seeing cardio and vascular tomorrow and Monday PCP Complains of throbbing pain

## 2025-02-20 NOTE — ASSESSMENT
[FreeTextEntry1] : RLE extensive ulcerations s/p wound debridement and graft application DOS 9/9/24. New onset right anterior ankle ulceration 3-4 weeks ago She has allodynia type pain pertaining to her neuropathy/PAD Continue prescribed gabapentin 300mg nightly for nerve pain. - i sent her another script at her request We discussed possible Hyperbaric oxygen therapy/ Wound care center referral patient and daughter declined she has tried in past and does not want to do it again. -  they want to defer continue collagen dressing changes atleast 3 times a week  - She has upcoming vascular appointment  aseptic debridement performed of all ulcerations with #15 blade to subcutaneous tissues wounds cleansed prior to debridement Continue VNS 3 times a week can continue dressings as noted today  has graft application surgery booked for tuesday   educated on signs and symptoms of infection. Advised Q48 hours dressing changes with collagen dressing xeroform 4x4 gauze kerlix and light ace  I then counseled the patient on performing self-examination of the feet on a daily basis. I explained the importance of this due to the diminished sensation and the greater susceptibility of getting an infection and having additional complications due to the medical condition that exists, especially if they lack protective sensation on their feet. I advised the patient to notify the office right away if increased redness, swelling, pain, open wounds or discharge were observed. I explained the importance of checking the glucose regularly and of keeping the glucose level between 90 -110 and more importantly controlling the HbA1C keeping consistent and trying to achieve as close to normal and HbA1C as possible around 6.0. I told the patient to notify the MD if the glucose level changed to above or below those levels. Advised to check feet daily and do not walk barefoot PTR after surgery

## 2025-02-20 NOTE — PHYSICAL EXAM
[General Appearance - Alert] : alert [General Appearance - In No Acute Distress] : in no acute distress [0] : left foot posterior tibialis 0 [1+] : left foot dorsalis pedis 1+ [Foot Ulcer] : foot ulcer [Skin Induration] : skin induration [Oriented To Time, Place, And Person] : oriented to person, place, and time [Impaired Insight] : insight and judgment were intact [Delayed in the Left Toes] : capillary refills normal in the left toes [FreeTextEntry3] : There is mild edema in right leg s/p bypass [FreeTextEntry1] : Wound stagnant same size from previous week.  Ulcerations measurement 5.1 x 1.8  x  0.2  lateral foot/ankle  There is no signs of clinical infection.  There is no erythema and or edema. There is skin atrophy and chronic skin changes from peripheral arterial disease. There is diffuse allodynia foot there is no calf tenderness. There is no fluctuance/crepitus  Wound  70% fibrotic there is significant biofilm present I aseptically debrided the wounds today with a #15 blade to subcutaneous tissue. No fluctuance/crepitus  There is new anterior ankle small 3 ulcerations they are each 0.4 x 0.4 x 0.1 with mild serous drainage one healed already since last visit  does not probe. no erythema  there is mild edema which has been consistent with previous weeks  [Vibration Dec.] : normal vibratory sensation at the level of the toes

## 2025-02-26 NOTE — HISTORY OF PRESENT ILLNESS
[de-identified] : Status post right femoral to popliteal artery bypass after [place]  She continues to see Dr. Horn and continues to struggle with open wounds.  She does have a seroma at the right medial calf with hide she has not changed in size.  Denies fever or chills.  She is accompanied by her daughter.

## 2025-02-26 NOTE — ASSESSMENT
[FreeTextEntry1] : 76-year-old female with a femoropopliteal bypass.  Duplex ultrasound suggest possible stenosis at the proximal anastomosis.  Given her previous history of believe we should perform a CT angiogram to see if there is a stenosis present.  He does have a palpable popliteal pulse.  I will call the daughter with results of the CAT scan findings CT angiogram of the right lower extremity ordered.  Yes

## 2025-02-26 NOTE — PHYSICAL EXAM
[Respiratory Effort] : normal respiratory effort [de-identified] : Appears well [FreeTextEntry1] : Palpable popliteal artery pulse Doppler distal signals. [de-identified] :  There is a seroma in the right medial calf incision unchanged from previous evaluation.  Right foot has podiatric dressings in place.

## 2025-02-28 NOTE — PHYSICAL EXAM
[General Appearance - Alert] : alert [General Appearance - In No Acute Distress] : in no acute distress [0] : left foot posterior tibialis 0 [1+] : left foot dorsalis pedis 1+ [Foot Ulcer] : foot ulcer [Skin Induration] : skin induration [Oriented To Time, Place, And Person] : oriented to person, place, and time [Impaired Insight] : insight and judgment were intact [Delayed in the Left Toes] : capillary refills normal in the left toes [FreeTextEntry3] : There is mild edema in right leg s/p bypass [FreeTextEntry1] : right lateral ankle ulceration with theraskin graft applied, graft actively incorporating and well adhered with staples. there is no erythema no drainage no clinical signs of infection  [Vibration Dec.] : normal vibratory sensation at the level of the toes

## 2025-02-28 NOTE — HISTORY OF PRESENT ILLNESS
[Flip Flops] : flip flops [FreeTextEntry1] : follow up right lower extremity ulcerations  s/p graft application at [facility] on 2/25/25  Patient has kept dressing d/c/i. NWB in a wheelchair  Minimum pain, is more discomfort FBS 89 states pain is better than she was preoperative

## 2025-02-28 NOTE — REASON FOR VISIT
[Post Operative Visit] : a post operative visit for [FreeTextEntry2] : graft application 2/25/25 right foot

## 2025-02-28 NOTE — ASSESSMENT
[FreeTextEntry1] : right lateral ankle ulceration s/p repeat debridement and graft application 2/25/25 graft actively incorporating doing well  no signs of infection redressed with adaptic bolster dressing keep dressing clean dry and intact until followup  Educated on sign and symptoms of infection including redness, swelling, drainage, worsening pain. Constitutional symptoms such as fever, nausea, vomiting, chills. Advised to call the office immediately if noted  I then counseled the patient on performing self-examination of the feet on a daily basis. I explained the importance of this due to the diminished sensation and the greater susceptibility of getting an infection and having additional complications due to the medical condition that exists, especially if they lack protective sensation on their feet. I advised the patient to notify the office right away if increased redness, swelling, pain, open wounds or discharge were observed. I explained the importance of checking the glucose regularly and of keeping the glucose level between 90 -110 and more importantly controlling the HbA1C keeping consistent and trying to achieve as close to normal and HbA1C as possible around 6.0. I told the patient to notify the MD if the glucose level changed to above or below those levels. Advised to check feet daily and do not walk barefoot PTR 1 week

## 2025-03-06 NOTE — PHYSICAL EXAM
[General Appearance - Alert] : alert [General Appearance - In No Acute Distress] : in no acute distress [0] : left foot posterior tibialis 0 [1+] : left foot dorsalis pedis 1+ [Foot Ulcer] : foot ulcer [Skin Induration] : skin induration [Oriented To Time, Place, And Person] : oriented to person, place, and time [Impaired Insight] : insight and judgment were intact [Vibration Dec.] : diminished vibratory sensation at the level of the toes [Delayed in the Left Toes] : capillary refills normal in the left toes [FreeTextEntry3] : There is mild edema in right leg s/p bypass [FreeTextEntry1] : right lateral ankle ulceration with theraskin graft applied, graft actively incorporating and well adhered with staples. There is increased serous drainage and periwound maceration to area. mild erythema no ascending erythema. evidence of graft adhering centrally

## 2025-03-06 NOTE — HISTORY OF PRESENT ILLNESS
[Flip Flops] : flip flops [FreeTextEntry1] : follow up right lower extremity ulcerations  s/p graft application at [facility] on 2/25/25  Patient has kept dressing d/c/i. NWB in a wheelchair  Minimum pain, is more discomfort FBS 89 states pain is better than she was preoperative  Update 03/06 Patient is present for right ankle ulceration, s/p graft application 2/25/25. Admits significant continued nerve pain. Did not increase gabapentin dosage as previously discussed.

## 2025-03-13 NOTE — ASSESSMENT
[FreeTextEntry1] : right lateral ankle ulceration s/p repeat debridement and graft application 2/25/25 graft staples removed today. excess graft debrided and nonviable wound bed debrided to level of subcutaneous tissue with scissor #15 blade tolerated well  and the wound bed was cross hatched.  continue prescribed clindamycin for 2 additional days advised to go to wound care center for hyperbaric oxygen therapy benefits discussed at length - does not want to go  redressed with silver alginate dressing, gential violet at wound borders. ordered VNS services for 3 times a week dressing changes to do the same.  keep dressing clean dry and intact until followup  Educated on sign and symptoms of infection including redness, swelling, drainage, worsening pain. Constitutional symptoms such as fever, nausea, vomiting, chills. Advised to call the office immediately if noted  I then counseled the patient on performing self-examination of the feet on a daily basis. I explained the importance of this due to the diminished sensation and the greater susceptibility of getting an infection and having additional complications due to the medical condition that exists, especially if they lack protective sensation on their feet. I advised the patient to notify the office right away if increased redness, swelling, pain, open wounds or discharge were observed. I explained the importance of checking the glucose regularly and of keeping the glucose level between 90 -110 and more importantly controlling the HbA1C keeping consistent and trying to achieve as close to normal and HbA1C as possible around 6.0. I told the patient to notify the MD if the glucose level changed to above or below those levels. Advised to check feet daily and do not walk barefoot #Painful neuropathy/neuralgia we discussed to consider pain management referral if not improved continue prescribed gabapentin PTR  2 weeks

## 2025-03-13 NOTE — PHYSICAL EXAM
[General Appearance - Alert] : alert [General Appearance - In No Acute Distress] : in no acute distress [0] : left foot posterior tibialis 0 [1+] : left foot dorsalis pedis 1+ [Foot Ulcer] : foot ulcer [Skin Induration] : skin induration [Vibration Dec.] : diminished vibratory sensation at the level of the toes [Oriented To Time, Place, And Person] : oriented to person, place, and time [Impaired Insight] : insight and judgment were intact [Delayed in the Left Toes] : capillary refills normal in the left toes [FreeTextEntry3] : There is mild edema in right leg s/p bypass [FreeTextEntry1] : right lateral ankle ulceration with theraskin graft applied, graft actively incorporating and well adhered with staples. There is increased serous drainage and periwound maceration to area.  no ascending erythema. evidence of graft adhering to entirety of wound bed 4.7 X 2.4  measures there is interval improvement of depth compared to prior surgery

## 2025-03-13 NOTE — HISTORY OF PRESENT ILLNESS
[Flip Flops] : flip flops [FreeTextEntry1] : follow up right lower extremity ulcerations  s/p graft application at [facility] on 2/25/25  Patient has kept dressing d/c/i. NWB in a wheelchair  Minimum pain, is more discomfort FBS 89 states pain is better than she was preoperative  Update 03/13 Patient is present for right ankle ulceration, s/p graft application 2/25/25.  Did not increase gabapentin dosage as previously discussed. Patient states she is having minimal pain; it has gotten better. Patient is taking oral antibiotic twice a day.

## 2025-03-28 NOTE — ASSESSMENT
[FreeTextEntry1] : right lateral ankle ulceration s/p repeat debridement and graft application 2/25/25  I performed aseptic debridement up to and including the subcutaneous tissue with a #15 blade. I irrigated the sites with wound cleanser. i applied gentian violet to wound borders. i applied silver alginate dressing   advised to go to wound care center for hyperbaric oxygen therapy benefits discussed at length - does not want to go  redressed with silver alginate dressing, gential violet at wound borders. ordered VNS services for 3 times a week dressing changes to do the same but should be changed daily  Educated on sign and symptoms of infection including redness, swelling, drainage, worsening pain. Constitutional symptoms such as fever, nausea, vomiting, chills. Advised to call the office immediately if noted  I then counseled the patient on performing self-examination of the feet on a daily basis. I explained the importance of this due to the diminished sensation and the greater susceptibility of getting an infection and having additional complications due to the medical condition that exists, especially if they lack protective sensation on their feet. I advised the patient to notify the office right away if increased redness, swelling, pain, open wounds or discharge were observed. I explained the importance of checking the glucose regularly and of keeping the glucose level between 90 -110 and more importantly controlling the HbA1C keeping consistent and trying to achieve as close to normal and HbA1C as possible around 6.0. I told the patient to notify the MD if the glucose level changed to above or below those levels. Advised to check feet daily and do not walk barefoot  #Painful neuropathy/neuralgia we discussed to consider pain management referral if not improved continue prescribed gabapentin PTR  2 weeks wound care

## 2025-03-28 NOTE — PHYSICAL EXAM
[General Appearance - Alert] : alert [General Appearance - In No Acute Distress] : in no acute distress [0] : left foot posterior tibialis 0 [1+] : left foot dorsalis pedis 1+ [Foot Ulcer] : foot ulcer [Skin Induration] : skin induration [Vibration Dec.] : diminished vibratory sensation at the level of the toes [Oriented To Time, Place, And Person] : oriented to person, place, and time [Impaired Insight] : insight and judgment were intact [Delayed in the Left Toes] : capillary refills normal in the left toes [FreeTextEntry3] : There is mild edema in right leg s/p bypass [FreeTextEntry1] : right lateral ankle ulceration with theraskin graft applied, graft actively incorporated -  there is interval improvement in size compared to last visit now measures 4.2 x 1.7 x 0.1. there is no longer any periwound maceration or drainage which resolved this visit.

## 2025-03-28 NOTE — HISTORY OF PRESENT ILLNESS
[Flip Flops] : flip flops [FreeTextEntry1] : follow up right lower extremity ulcerations  s/p graft application at [facility] on 2/25/25  Patient has kept dressing d/c/i. NWB in a wheelchair  Minimum pain, is more discomfort FBS 89 states pain is better than she was preoperative  Update 03/13 Patient is present for right ankle ulceration, s/p graft application 2/25/25.  Did not increase gabapentin dosage as previously discussed. Patient states she is having minimal pain; it has gotten better. Patient is taking oral antibiotic twice a day.  Update 03/28 presents for follow up for wound care The daughter has been doing daily dressing changes and noted improvement the drainage resolved.  Patient is present with dsd dressings. Patient completed oral antibiotic. Patient is having minimal pain.  FBS <120

## 2025-04-11 NOTE — HISTORY OF PRESENT ILLNESS
[Flip Flops] : flip flops [FreeTextEntry1] : follow up right lower extremity ulcerations  s/p graft application at [facility] on 2/25/25  they are using aquacel ag and collagen dressings. they note continued improvement  gabapentin is helping nerve pain.  She states she is feeling good

## 2025-04-11 NOTE — ASSESSMENT
[FreeTextEntry1] : right lateral ankle ulceration s/p repeat debridement and graft application 2/25/25  I performed aseptic debridement up to and including the subcutaneous tissue with a #15 blade. I irrigated the sites with wound cleanser. i applied gentian violet to wound borders. i applied silver alginate dressing   advised to go to wound care center for hyperbaric oxygen therapy benefits discussed at length if worsening.  redressed with silver alginate dressing, gentian violet at wound borders. ordered VNS services for 3 times a week dressing changes to do the same but should be changed daily which the daughter is doing Educated on sign and symptoms of infection including redness, swelling, drainage, worsening pain. Constitutional symptoms such as fever, nausea, vomiting, chills. Advised to call the office immediately if noted  I then counseled the patient on performing self-examination of the feet on a daily basis. I explained the importance of this due to the diminished sensation and the greater susceptibility of getting an infection and having additional complications due to the medical condition that exists, especially if they lack protective sensation on their feet. I advised the patient to notify the office right away if increased redness, swelling, pain, open wounds or discharge were observed. I explained the importance of checking the glucose regularly and of keeping the glucose level between 90 -110 and more importantly controlling the HbA1C keeping consistent and trying to achieve as close to normal and HbA1C as possible around 6.0. I told the patient to notify the MD if the glucose level changed to above or below those levels. Advised to check feet daily and do not walk barefoot  #Painful neuropathy/neuralgia we discussed to consider pain management referral if not improved continue prescribed gabapentin PTR  2 weeks wound care

## 2025-04-11 NOTE — PHYSICAL EXAM
[General Appearance - Alert] : alert [General Appearance - In No Acute Distress] : in no acute distress [0] : left foot posterior tibialis 0 [1+] : left foot dorsalis pedis 1+ [Foot Ulcer] : foot ulcer [Skin Induration] : skin induration [Vibration Dec.] : diminished vibratory sensation at the level of the toes [Oriented To Time, Place, And Person] : oriented to person, place, and time [Impaired Insight] : insight and judgment were intact [Delayed in the Left Toes] : capillary refills normal in the left toes [FreeTextEntry3] : There is mild edema in right leg s/p bypass  [FreeTextEntry1] : right lateral ankle ulceration with theraskin graft applied, graft actively incorporated -  there is interval improvement continued improvement in size compared to last visit now measures 3.7 x 1.2  x 0.1 cm. mild periwound maceration mild serous drainage

## 2025-04-25 NOTE — HISTORY OF PRESENT ILLNESS
[Flip Flops] : flip flops [FreeTextEntry1] : follow up right lower extremity ulcerations  s/p graft application at [facility] on 2/25/25  they are using aquacel ag and collagen dressings. they note continued improvement  gabapentin is helping nerve pain.  She states she is feeling good

## 2025-04-25 NOTE — ASSESSMENT
[FreeTextEntry1] : right lateral ankle ulceration s/p repeat debridement and graft application 2/25/25  I performed aseptic debridement up to and including the subcutaneous tissue with a #15 blade. I irrigated the sites with wound cleanser. i applied gentian violet to wound borders. i applied silver alginate dressing   advised to go to wound care center for hyperbaric oxygen therapy benefits discussed at length if worsening.  redressed with silver alginate dressing, gentian violet at wound borders.  Educated on sign and symptoms of infection including redness, swelling, drainage, worsening pain. Constitutional symptoms such as fever, nausea, vomiting, chills. Advised to call the office immediately if noted  I then counseled the patient on performing self-examination of the feet on a daily basis. I explained the importance of this due to the diminished sensation and the greater susceptibility of getting an infection and having additional complications due to the medical condition that exists, especially if they lack protective sensation on their feet. I advised the patient to notify the office right away if increased redness, swelling, pain, open wounds or discharge were observed. I explained the importance of checking the glucose regularly and of keeping the glucose level between 90 -110 and more importantly controlling the HbA1C keeping consistent and trying to achieve as close to normal and HbA1C as possible around 6.0. I told the patient to notify the MD if the glucose level changed to above or below those levels. Advised to check feet daily and do not walk barefoot  #Painful neuropathy/neuralgia we discussed to consider pain management referral if not improved continue prescribed gabapentin PTR  2 weeks wound care - they are going away for 2 -3 weeks we discussed risk/benefits

## 2025-04-25 NOTE — PHYSICAL EXAM
[General Appearance - Alert] : alert [General Appearance - In No Acute Distress] : in no acute distress [0] : left foot posterior tibialis 0 [1+] : left foot dorsalis pedis 1+ [Foot Ulcer] : foot ulcer [Skin Induration] : skin induration [Vibration Dec.] : diminished vibratory sensation at the level of the toes [Oriented To Time, Place, And Person] : oriented to person, place, and time [Impaired Insight] : insight and judgment were intact [Delayed in the Left Toes] : capillary refills normal in the left toes [FreeTextEntry1] : lateral ankle [FreeTextEntry2] : 3.8 [FreeTextEntry3] : 1.2 [FreeTextEntry4] : 0.1 - 0.2 [TWNoteComboBox1] : Right [TWNoteComboBox2] : 2 [TWNoteComboBox3] : PT [TWNoteComboBox4] : Moderate [TWNoteComboBox5] : No [TWNoteComboBox6] : Arterial [de-identified] : No [de-identified] : Macerated [de-identified] : None [de-identified] : None [de-identified] : 50% [TWNoteComboBox7] : Magalis [de-identified] : Debridement performed of all devitalized tissue to bleeding viable tissue

## 2025-05-09 NOTE — HISTORY OF PRESENT ILLNESS
[Flip Flops] : flip flops [FreeTextEntry1] : follow up right lower extremity ulcerations  s/p graft application at [facility] on 2/25/25  they are using aquacel ag and collagen dressings. daughter present today concerned wound is getting deeper they switched to regranex a week ago  gabapentin is helping nerve pain.  States she needs her nails cut, she has difficulty cutting her own nails. Needs more supplies.  [place]  Measures 5/9/25 5.0 X 1.5 X 0.3

## 2025-05-09 NOTE — PHYSICAL EXAM
[General Appearance - Alert] : alert [General Appearance - In No Acute Distress] : in no acute distress [0] : left foot posterior tibialis 0 [1+] : left foot dorsalis pedis 1+ [Foot Ulcer] : foot ulcer [Skin Induration] : skin induration [Vibration Dec.] : diminished vibratory sensation at the level of the toes [Oriented To Time, Place, And Person] : oriented to person, place, and time [Impaired Insight] : insight and judgment were intact [Delayed in the Left Toes] : capillary refills normal in the left toes [FreeTextEntry1] : lateral ankle [FreeTextEntry2] : 5.0 [FreeTextEntry3] : 1.5 [FreeTextEntry4] : 0.3 [TWNoteComboBox1] : Right [TWNoteComboBox2] : 2 [TWNoteComboBox3] : PT [TWNoteComboBox4] : Moderate [TWNoteComboBox5] : No [TWNoteComboBox6] : Arterial [de-identified] : No [de-identified] : Macerated [de-identified] : None [de-identified] : None [de-identified] : 50% [TWNoteComboBox7] : Magalis [de-identified] : Debridement performed of all devitalized tissue to bleeding viable tissue

## 2025-05-09 NOTE — ASSESSMENT
[FreeTextEntry1] : right lateral ankle ulceration s/p repeat debridement and graft application 2/25/25  I performed aseptic debridement up to and including the subcutaneous tissue with a #15 blade. I irrigated the sites with wound cleanser. i applied silver alginate dressing   Due to chronicity of wound and acutely worsening. Consent was obtained for local anesthesia with #1 % lidocaine and wound biopsy. Using a 4mm punch a wound biopsy was obtained and sent to lab  advised to go to wound care center for hyperbaric oxygen therapy benefits discussed at length redressed with silver alginate dressing, gentian violet at wound borders.  Educated on sign and symptoms of infection including redness, swelling, drainage, worsening pain. Constitutional symptoms such as fever, nausea, vomiting, chills. Advised to call the office immediately if noted  I then counseled the patient on performing self-examination of the feet on a daily basis. I explained the importance of this due to the diminished sensation and the greater susceptibility of getting an infection and having additional complications due to the medical condition that exists, especially if they lack protective sensation on their feet. I advised the patient to notify the office right away if increased redness, swelling, pain, open wounds or discharge were observed. I explained the importance of checking the glucose regularly and of keeping the glucose level between 90 -110 and more importantly controlling the HbA1C keeping consistent and trying to achieve as close to normal and HbA1C as possible around 6.0. I told the patient to notify the MD if the glucose level changed to above or below those levels. Advised to check feet daily and do not walk barefoot - discontinue regranex - switch back to collagen dressing with silver alginate  - c/w follow up with vascular team   #Painful neuropathy/neuralgia we discussed to consider pain management referral if not improved continue prescribed gabapentin PTR  2 weeks wound care - they are going away for 2 -3 weeks we discussed risk/benefits

## 2025-05-29 NOTE — PHYSICAL EXAM
[General Appearance - Alert] : alert [General Appearance - In No Acute Distress] : in no acute distress [0] : left foot posterior tibialis 0 [1+] : left foot dorsalis pedis 1+ [Foot Ulcer] : foot ulcer [Skin Induration] : skin induration [Vibration Dec.] : diminished vibratory sensation at the level of the toes [Oriented To Time, Place, And Person] : oriented to person, place, and time [Impaired Insight] : insight and judgment were intact [Delayed in the Left Toes] : capillary refills normal in the left toes [FreeTextEntry1] : lateral ankle [FreeTextEntry2] : 4.8 [FreeTextEntry3] : 1.5 [FreeTextEntry4] : 0.3 [TWNoteComboBox1] : Right [TWNoteComboBox2] : 2 [TWNoteComboBox3] : PT [TWNoteComboBox4] : Moderate [TWNoteComboBox5] : No [TWNoteComboBox6] : Arterial [de-identified] : No [de-identified] : Macerated [de-identified] : None [de-identified] : None [de-identified] : 50% [TWNoteComboBox7] : Magalis [de-identified] : Debridement performed of all devitalized tissue to bleeding viable tissue

## 2025-05-29 NOTE — HISTORY OF PRESENT ILLNESS
[Flip Flops] : flip flops [FreeTextEntry1] : follow up right lower extremity ulcerations  s/p graft application at [facility] on 2/25/25  they are using aquacel ag and collagen dressings. She was in [place] for about 2 weeks and states wound is a lot better  gabapentin is helping nerve pain.    States drainage improving States she has been working on ambulating more

## 2025-05-29 NOTE — ASSESSMENT
[FreeTextEntry1] : right lateral ankle ulceration s/p repeat debridement and graft application 2/25/25  I performed aseptic debridement up to and including the subcutaneous tissue with a #15 blade. I irrigated the sites with wound cleanser. 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 she could benefit from wound care center. Gave her information redressed with silver alginate dressing  Educated on sign and symptoms of infection including redness, swelling, drainage, worsening pain. Constitutional symptoms such as fever, nausea, vomiting, chills. Advised to call the office immediately if noted  I then counseled the patient on performing self-examination of the feet on a daily basis. I explained the importance of this due to the diminished sensation and the greater susceptibility of getting an infection and having additional complications due to the medical condition that exists, especially if they lack protective sensation on their feet. I advised the patient to notify the office right away if increased redness, swelling, pain, open wounds or discharge were observed. I explained the importance of checking the glucose regularly and of keeping the glucose level between 90 -110 and more importantly controlling the HbA1C keeping consistent and trying to achieve as close to normal and HbA1C as possible around 6.0. I told the patient to notify the MD if the glucose level changed to above or below those levels. Advised to check feet daily and do not walk barefoot - c/w follow up with vascular team  - We discussed referring to plastics team for further grafting if she desired. This was offered to the patient  - encouraged ambulation   #Painful neuropathy/neuralgia we discussed to consider pain management referral if not improved continue prescribed gabapentin PTR  2 weeks wound care  if not going to wound care center

## 2025-06-13 NOTE — PHYSICAL EXAM
[General Appearance - Alert] : alert [General Appearance - In No Acute Distress] : in no acute distress [Delayed in the Left Toes] : capillary refills normal in the left toes [0] : left foot posterior tibialis 0 [1+] : left foot dorsalis pedis 1+ [Foot Ulcer] : foot ulcer [Skin Induration] : skin induration [FreeTextEntry1] : lateral ankle [FreeTextEntry2] : 4.8 [FreeTextEntry3] : 1.5 [FreeTextEntry4] : 0.3 [TWNoteComboBox1] : Right [TWNoteComboBox2] : 2 [TWNoteComboBox3] : PT [TWNoteComboBox4] : Moderate [TWNoteComboBox5] : No [TWNoteComboBox6] : Arterial [de-identified] : No [de-identified] : Macerated [de-identified] : None [de-identified] : None [de-identified] : 50% [TWNoteComboBox7] : Magalis [de-identified] : Debridement performed of all devitalized tissue to bleeding viable tissue [Vibration Dec.] : diminished vibratory sensation at the level of the toes [Oriented To Time, Place, And Person] : oriented to person, place, and time [Impaired Insight] : insight and judgment were intact

## 2025-06-13 NOTE — HISTORY OF PRESENT ILLNESS
[Flip Flops] : flip flops [FreeTextEntry1] : follow up right lower extremity ulcerations  s/p graft application at [facility] on 2/25/25  they are using aquacel ag and collagen dressings. She was in [place] for about 2 weeks and states wound is a lot better  gabapentin is helping nerve pain.   States drainage improving States she has been working on ambulating more   Update 06/13 Patient is present for a follow up. Patient is complaining of drainage. Patient states the past two days she has noticed redness area the wound and drainage. Patient states she in extreme pain. Patient daughter is walking a lot going up and down the stairs. Patient is not elevating her leg much.

## 2025-06-13 NOTE — PHYSICAL EXAM
[General Appearance - Alert] : alert [General Appearance - In No Acute Distress] : in no acute distress [Delayed in the Left Toes] : capillary refills normal in the left toes [0] : left foot posterior tibialis 0 [1+] : left foot dorsalis pedis 1+ [Foot Ulcer] : foot ulcer [Skin Induration] : skin induration [FreeTextEntry1] : lateral ankle [FreeTextEntry2] : 4.8 [FreeTextEntry3] : 1.5 [FreeTextEntry4] : 0.3 [TWNoteComboBox1] : Right [TWNoteComboBox2] : 2 [TWNoteComboBox3] : PT [TWNoteComboBox4] : Moderate [TWNoteComboBox5] : No [TWNoteComboBox6] : Arterial [de-identified] : No [de-identified] : Macerated [de-identified] : None [de-identified] : None [de-identified] : 50% [TWNoteComboBox7] : Magalis [de-identified] : Debridement performed of all devitalized tissue to bleeding viable tissue [Vibration Dec.] : diminished vibratory sensation at the level of the toes [Oriented To Time, Place, And Person] : oriented to person, place, and time [Impaired Insight] : insight and judgment were intact

## 2025-06-13 NOTE — HISTORY OF PRESENT ILLNESS
[Flip Flops] : flip flops [FreeTextEntry1] : follow up right lower extremity ulcerations  s/p graft application at [facility] on 2/25/25  they are using aquacel ag and collagen dressings. She was in [place] for about 2 weeks and states wound is a lot better  gabapentin is helping nerve pain.   States drainage improving States she has been working on ambulating more   Update 06/13 Patient is present for a follow up. Patient is complaining of drainage. Patient states the past two days she has noticed redness area the wound and drainage. Patient states she in extreme pain. Patient daughter is walking a lot going up and down the stairs. Patient is not elevating her leg much.